# Patient Record
Sex: FEMALE | Race: WHITE
[De-identification: names, ages, dates, MRNs, and addresses within clinical notes are randomized per-mention and may not be internally consistent; named-entity substitution may affect disease eponyms.]

---

## 2020-01-17 ENCOUNTER — HOSPITAL ENCOUNTER (EMERGENCY)
Dept: HOSPITAL 8 - ED | Age: 25
Discharge: HOME | End: 2020-01-17
Payer: COMMERCIAL

## 2020-01-17 VITALS — HEIGHT: 61 IN | WEIGHT: 100.31 LBS | BODY MASS INDEX: 18.94 KG/M2

## 2020-01-17 VITALS — DIASTOLIC BLOOD PRESSURE: 67 MMHG | SYSTOLIC BLOOD PRESSURE: 109 MMHG

## 2020-01-17 DIAGNOSIS — K29.00: Primary | ICD-10-CM

## 2020-01-17 DIAGNOSIS — F17.210: ICD-10-CM

## 2020-01-17 LAB
ALBUMIN SERPL-MCNC: 4.2 G/DL (ref 3.4–5)
ALP SERPL-CCNC: 46 U/L (ref 45–117)
ALT SERPL-CCNC: 12 U/L (ref 12–78)
ANION GAP SERPL CALC-SCNC: 9 MMOL/L (ref 5–15)
BASOPHILS # BLD AUTO: 0.11 X10^3/UL (ref 0–0.1)
BASOPHILS NFR BLD AUTO: 2 % (ref 0–1)
BILIRUB SERPL-MCNC: 0.7 MG/DL (ref 0.2–1)
CALCIUM SERPL-MCNC: 8.6 MG/DL (ref 8.5–10.1)
CHLORIDE SERPL-SCNC: 108 MMOL/L (ref 98–107)
CREAT SERPL-MCNC: 0.85 MG/DL (ref 0.55–1.02)
CULTURE INDICATED?: YES
EOSINOPHIL # BLD AUTO: 0.3 X10^3/UL (ref 0–0.4)
EOSINOPHIL NFR BLD AUTO: 4 % (ref 1–7)
ERYTHROCYTE [DISTWIDTH] IN BLOOD BY AUTOMATED COUNT: 13.3 % (ref 9.6–15.2)
HCG UR SG: 1.02 (ref 1–1.03)
LYMPHOCYTES # BLD AUTO: 1.94 X10^3/UL (ref 1–3.4)
LYMPHOCYTES NFR BLD AUTO: 28 % (ref 22–44)
MCH RBC QN AUTO: 33.4 PG (ref 27–34.8)
MCHC RBC AUTO-ENTMCNC: 32.5 G/DL (ref 32.4–35.8)
MCV RBC AUTO: 102.9 FL (ref 80–100)
MD: NO
MICROSCOPIC: (no result)
MONOCYTES # BLD AUTO: 0.73 X10^3/UL (ref 0.2–0.8)
MONOCYTES NFR BLD AUTO: 11 % (ref 2–9)
NEUTROPHILS # BLD AUTO: 3.9 X10^3/UL (ref 1.8–6.8)
NEUTROPHILS NFR BLD AUTO: 56 % (ref 42–75)
PLATELET # BLD AUTO: 337 X10^3/UL (ref 130–400)
PMV BLD AUTO: 7.8 FL (ref 7.4–10.4)
PROT SERPL-MCNC: 7.6 G/DL (ref 6.4–8.2)
RBC # BLD AUTO: 4.47 X10^6/UL (ref 3.82–5.3)

## 2020-01-17 PROCEDURE — 80053 COMPREHEN METABOLIC PANEL: CPT

## 2020-01-17 PROCEDURE — 81025 URINE PREGNANCY TEST: CPT

## 2020-01-17 PROCEDURE — 87086 URINE CULTURE/COLONY COUNT: CPT

## 2020-01-17 PROCEDURE — 76700 US EXAM ABDOM COMPLETE: CPT

## 2020-01-17 PROCEDURE — 83690 ASSAY OF LIPASE: CPT

## 2020-01-17 PROCEDURE — 85025 COMPLETE CBC W/AUTO DIFF WBC: CPT

## 2020-01-17 PROCEDURE — 81001 URINALYSIS AUTO W/SCOPE: CPT

## 2020-01-17 PROCEDURE — 99284 EMERGENCY DEPT VISIT MOD MDM: CPT

## 2020-01-17 PROCEDURE — 36415 COLL VENOUS BLD VENIPUNCTURE: CPT

## 2020-01-17 NOTE — NUR
PT TO ROOM FROM LOBBY, CHANGED INTO GOWN, UPRIGHT ON GURNEY AWAKE & CALM, 
RESPONDS APPROP TO STAFF, NAD, COMFORT MEASURES PROVIDED, FRIEND AT BS, CALL 
LIGHT WITHIN REACH.

## 2020-01-17 NOTE — NUR
PT REMAINS UPRIGHT ON GURNEY AWAKE & CALM, WATCHING TV, RESPONDS APPROP TO 
STAFF, NAD, COMFORT MEASURES PROVIDED, FRIEND AT BS, CALL LIGHT WITHIN REACH.

## 2022-07-09 ENCOUNTER — OFFICE VISIT (OUTPATIENT)
Dept: URGENT CARE | Facility: PHYSICIAN GROUP | Age: 27
End: 2022-07-09
Payer: COMMERCIAL

## 2022-07-09 VITALS
TEMPERATURE: 99.2 F | RESPIRATION RATE: 20 BRPM | DIASTOLIC BLOOD PRESSURE: 68 MMHG | HEIGHT: 61 IN | HEART RATE: 86 BPM | BODY MASS INDEX: 20.77 KG/M2 | WEIGHT: 110 LBS | OXYGEN SATURATION: 93 % | SYSTOLIC BLOOD PRESSURE: 92 MMHG

## 2022-07-09 DIAGNOSIS — M79.641 BILATERAL HAND PAIN: ICD-10-CM

## 2022-07-09 DIAGNOSIS — M79.642 BILATERAL HAND PAIN: ICD-10-CM

## 2022-07-09 PROCEDURE — 99203 OFFICE O/P NEW LOW 30 MIN: CPT

## 2022-07-09 RX ORDER — KETOROLAC TROMETHAMINE 30 MG/ML
30 INJECTION, SOLUTION INTRAMUSCULAR; INTRAVENOUS ONCE
Status: COMPLETED | OUTPATIENT
Start: 2022-07-09 | End: 2022-07-09

## 2022-07-09 RX ORDER — RISPERIDONE 0.5 MG/1
0.5 TABLET ORAL 2 TIMES DAILY
COMMUNITY
Start: 2022-05-23

## 2022-07-09 RX ORDER — BUSPIRONE HYDROCHLORIDE 10 MG/1
10 TABLET ORAL 2 TIMES DAILY
COMMUNITY
Start: 2022-05-23

## 2022-07-09 RX ORDER — KETOROLAC TROMETHAMINE 30 MG/ML
30 INJECTION, SOLUTION INTRAMUSCULAR; INTRAVENOUS ONCE
Status: DISCONTINUED | OUTPATIENT
Start: 2022-07-09 | End: 2022-07-09

## 2022-07-09 RX ADMIN — KETOROLAC TROMETHAMINE 30 MG: 30 INJECTION, SOLUTION INTRAMUSCULAR; INTRAVENOUS at 15:44

## 2022-07-09 ASSESSMENT — ENCOUNTER SYMPTOMS
CHILLS: 0
FEVER: 0
TINGLING: 1
WEIGHT LOSS: 0

## 2022-07-09 NOTE — PROGRESS NOTES
"Subjective:   Anthony Kong is a 26 y.o. female who presents for Arm Pain (Bilateral- 1x wk /Tingling in finger tups, numbness )      HPI:  This is a 26-year-old female who presents today with bilateral hand pain x3 days.  She reports associated tingling to both index and middle fingers.  She reports 10 out of 10 \"sharp pain \".  She denies trauma.  Believes discomfort is associated with repeated movements.  She has tried Epson salt soaks, ice, elevation with minimal relief.  Denies back or neck pain.      Review of Systems   Constitutional: Negative for chills, fever, malaise/fatigue and weight loss.   Neurological: Positive for tingling.   All other systems reviewed and are negative.      Medications:    Current Outpatient Medications on File Prior to Visit   Medication Sig Dispense Refill   • risperiDONE (RISPERDAL) 0.5 MG Tab Take 0.5 mg by mouth 2 times a day.     • busPIRone (BUSPAR) 10 MG Tab tablet Take 10 mg by mouth 2 times a day.     • polyethylene glycol/lytes (MIRALAX) Pack Take 1 Packet by mouth every day. (Patient not taking: Reported on 7/9/2022) 10 Each 3     No current facility-administered medications on file prior to visit.        Allergies:   Patient has no known allergies.    Problem List:   There is no problem list on file for this patient.       Surgical History:  No past surgical history on file.    Past Social Hx:   Social History     Tobacco Use   • Smoking status: Never Smoker   • Smokeless tobacco: Never Used   Vaping Use   • Vaping Use: Every day   • Substances: Nicotine, Flavoring   • Devices: Pre-filled or refillable cartridge, Refillable tank   Substance Use Topics   • Alcohol use: No   • Drug use: No          Problem list, medications, and allergies reviewed by myself today in Epic.     Objective:     BP (!) 92/68 (BP Location: Right arm, Patient Position: Sitting, BP Cuff Size: Adult)   Pulse 86   Temp 37.3 °C (99.2 °F) (Temporal)   Resp 20   Ht 1.549 m (5' 1\")   Wt " 49.9 kg (110 lb)   SpO2 93%   BMI 20.78 kg/m²     Physical Exam  Vitals and nursing note reviewed.   Constitutional:       General: She is not in acute distress.     Appearance: Normal appearance. She is normal weight. She is not ill-appearing or toxic-appearing.   HENT:      Head: Normocephalic and atraumatic.      Nose: Nose normal.   Cardiovascular:      Rate and Rhythm: Normal rate and regular rhythm.      Pulses: Normal pulses.      Heart sounds: Normal heart sounds.   Pulmonary:      Effort: Pulmonary effort is normal. No respiratory distress.      Breath sounds: Normal breath sounds. No stridor. No wheezing or rales.   Musculoskeletal:      Right hand: Tenderness present. No swelling, deformity or bony tenderness. Normal range of motion. Normal capillary refill. Normal pulse.      Left hand: Tenderness present. No swelling, deformity or bony tenderness. Normal range of motion. Normal capillary refill. Normal pulse.      Comments: +TTP to palms of both right and left hands.   Skin:     General: Skin is warm and dry.      Capillary Refill: Capillary refill takes less than 2 seconds.      Findings: No erythema or rash.   Neurological:      General: No focal deficit present.      Mental Status: She is alert and oriented to person, place, and time. Mental status is at baseline.   Psychiatric:         Mood and Affect: Mood normal.         Behavior: Behavior normal.         Thought Content: Thought content normal.         Judgment: Judgment normal.         Assessment/Plan:     Diagnosis and associated orders:   1. Bilateral hand pain  ketorolac (TORADOL) injection 30 mg    DISCONTINUED: ketorolac (TORADOL) injection 30 mg      Comments/MDM:   Pt is clinically stable at today's acute urgent care visit.  No acute distress noted. Appropriate for outpatient management at this time.   Unclear etiology of patient's pain.  Unremarkable physical exam today.  Patient given dose of Toradol for pain in clinic today  Advised  patient to continue use of Tylenol and ibuprofen as needed ice, elevate, and minimize use of hands  Return for any new or worsening signs or symptoms  Patient agreeable to plan of care today, verbalizes understanding             Please note that this dictation was created using voice recognition software. I have made a reasonable attempt to correct obvious errors, but I expect that there are errors of grammar and possibly content that I did not discover before finalizing the note.    This note was electronically signed by DALLAS Mosqueda

## 2022-07-17 ENCOUNTER — HOSPITAL ENCOUNTER (EMERGENCY)
Facility: MEDICAL CENTER | Age: 27
End: 2022-07-18
Attending: EMERGENCY MEDICINE
Payer: COMMERCIAL

## 2022-07-17 DIAGNOSIS — M25.532 PAIN IN BOTH WRISTS: ICD-10-CM

## 2022-07-17 DIAGNOSIS — M25.531 PAIN IN BOTH WRISTS: ICD-10-CM

## 2022-07-17 DIAGNOSIS — M25.50 ARTHRALGIA, UNSPECIFIED JOINT: ICD-10-CM

## 2022-07-17 DIAGNOSIS — R52 BODY ACHES: ICD-10-CM

## 2022-07-17 PROCEDURE — 99284 EMERGENCY DEPT VISIT MOD MDM: CPT

## 2022-07-18 ENCOUNTER — APPOINTMENT (OUTPATIENT)
Dept: RADIOLOGY | Facility: MEDICAL CENTER | Age: 27
End: 2022-07-18
Attending: EMERGENCY MEDICINE
Payer: COMMERCIAL

## 2022-07-18 ENCOUNTER — APPOINTMENT (OUTPATIENT)
Dept: LAB | Facility: MEDICAL CENTER | Age: 27
End: 2022-07-18
Payer: COMMERCIAL

## 2022-07-18 VITALS
OXYGEN SATURATION: 97 % | WEIGHT: 103.17 LBS | HEIGHT: 64 IN | SYSTOLIC BLOOD PRESSURE: 117 MMHG | DIASTOLIC BLOOD PRESSURE: 73 MMHG | RESPIRATION RATE: 18 BRPM | BODY MASS INDEX: 17.61 KG/M2 | TEMPERATURE: 98.2 F | HEART RATE: 63 BPM

## 2022-07-18 LAB
ALBUMIN SERPL BCP-MCNC: 4.9 G/DL (ref 3.2–4.9)
ALBUMIN/GLOB SERPL: 1.9 G/DL
ALP SERPL-CCNC: 43 U/L (ref 30–99)
ALT SERPL-CCNC: 7 U/L (ref 2–50)
ANION GAP SERPL CALC-SCNC: 14 MMOL/L (ref 7–16)
APPEARANCE UR: CLEAR
AST SERPL-CCNC: 10 U/L (ref 12–45)
BACTERIA #/AREA URNS HPF: ABNORMAL /HPF
BASOPHILS # BLD AUTO: 0.2 % (ref 0–1.8)
BASOPHILS # BLD: 0.03 K/UL (ref 0–0.12)
BILIRUB SERPL-MCNC: 0.3 MG/DL (ref 0.1–1.5)
BILIRUB UR QL STRIP.AUTO: NEGATIVE
BUN SERPL-MCNC: 8 MG/DL (ref 8–22)
CALCIUM SERPL-MCNC: 9.5 MG/DL (ref 8.4–10.2)
CHLORIDE SERPL-SCNC: 104 MMOL/L (ref 96–112)
CO2 SERPL-SCNC: 20 MMOL/L (ref 20–33)
COLOR UR: YELLOW
CREAT SERPL-MCNC: 0.65 MG/DL (ref 0.5–1.4)
CRP SERPL HS-MCNC: <0.3 MG/DL (ref 0–0.75)
EOSINOPHIL # BLD AUTO: 0.01 K/UL (ref 0–0.51)
EOSINOPHIL NFR BLD: 0.1 % (ref 0–6.9)
EPI CELLS #/AREA URNS HPF: ABNORMAL /HPF
ERYTHROCYTE [DISTWIDTH] IN BLOOD BY AUTOMATED COUNT: 42.8 FL (ref 35.9–50)
GFR SERPLBLD CREATININE-BSD FMLA CKD-EPI: 125 ML/MIN/1.73 M 2
GLOBULIN SER CALC-MCNC: 2.6 G/DL (ref 1.9–3.5)
GLUCOSE SERPL-MCNC: 122 MG/DL (ref 65–99)
GLUCOSE UR STRIP.AUTO-MCNC: NEGATIVE MG/DL
HCG SERPL QL: NEGATIVE
HCT VFR BLD AUTO: 41.8 % (ref 37–47)
HGB BLD-MCNC: 14.5 G/DL (ref 12–16)
IMM GRANULOCYTES # BLD AUTO: 0.06 K/UL (ref 0–0.11)
IMM GRANULOCYTES NFR BLD AUTO: 0.5 % (ref 0–0.9)
KETONES UR STRIP.AUTO-MCNC: NEGATIVE MG/DL
LEUKOCYTE ESTERASE UR QL STRIP.AUTO: NEGATIVE
LYMPHOCYTES # BLD AUTO: 0.98 K/UL (ref 1–4.8)
LYMPHOCYTES NFR BLD: 7.9 % (ref 22–41)
MCH RBC QN AUTO: 33.4 PG (ref 27–33)
MCHC RBC AUTO-ENTMCNC: 34.7 G/DL (ref 33.6–35)
MCV RBC AUTO: 96.3 FL (ref 81.4–97.8)
MICRO URNS: ABNORMAL
MONOCYTES # BLD AUTO: 0.41 K/UL (ref 0–0.85)
MONOCYTES NFR BLD AUTO: 3.3 % (ref 0–13.4)
MUCOUS THREADS #/AREA URNS HPF: ABNORMAL /HPF
NEUTROPHILS # BLD AUTO: 10.89 K/UL (ref 2–7.15)
NEUTROPHILS NFR BLD: 88 % (ref 44–72)
NITRITE UR QL STRIP.AUTO: NEGATIVE
NRBC # BLD AUTO: 0 K/UL
NRBC BLD-RTO: 0 /100 WBC
PH UR STRIP.AUTO: 6 [PH] (ref 5–8)
PLATELET # BLD AUTO: 374 K/UL (ref 164–446)
PMV BLD AUTO: 9.6 FL (ref 9–12.9)
POTASSIUM SERPL-SCNC: 3.8 MMOL/L (ref 3.6–5.5)
PROT SERPL-MCNC: 7.5 G/DL (ref 6–8.2)
PROT UR QL STRIP: NEGATIVE MG/DL
RBC # BLD AUTO: 4.34 M/UL (ref 4.2–5.4)
RBC # URNS HPF: ABNORMAL /HPF
RBC UR QL AUTO: ABNORMAL
SODIUM SERPL-SCNC: 138 MMOL/L (ref 135–145)
SP GR UR STRIP.AUTO: 1.01
WBC # BLD AUTO: 12.4 K/UL (ref 4.8–10.8)
WBC #/AREA URNS HPF: ABNORMAL /HPF

## 2022-07-18 PROCEDURE — 80053 COMPREHEN METABOLIC PANEL: CPT

## 2022-07-18 PROCEDURE — 85025 COMPLETE CBC W/AUTO DIFF WBC: CPT

## 2022-07-18 PROCEDURE — 81001 URINALYSIS AUTO W/SCOPE: CPT

## 2022-07-18 PROCEDURE — 73110 X-RAY EXAM OF WRIST: CPT | Mod: RT

## 2022-07-18 PROCEDURE — 700102 HCHG RX REV CODE 250 W/ 637 OVERRIDE(OP): Performed by: EMERGENCY MEDICINE

## 2022-07-18 PROCEDURE — 96372 THER/PROPH/DIAG INJ SC/IM: CPT

## 2022-07-18 PROCEDURE — 36415 COLL VENOUS BLD VENIPUNCTURE: CPT

## 2022-07-18 PROCEDURE — 86140 C-REACTIVE PROTEIN: CPT

## 2022-07-18 PROCEDURE — 96374 THER/PROPH/DIAG INJ IV PUSH: CPT

## 2022-07-18 PROCEDURE — 73110 X-RAY EXAM OF WRIST: CPT | Mod: LT

## 2022-07-18 PROCEDURE — 84703 CHORIONIC GONADOTROPIN ASSAY: CPT

## 2022-07-18 PROCEDURE — A9270 NON-COVERED ITEM OR SERVICE: HCPCS | Performed by: EMERGENCY MEDICINE

## 2022-07-18 PROCEDURE — 700111 HCHG RX REV CODE 636 W/ 250 OVERRIDE (IP): Performed by: EMERGENCY MEDICINE

## 2022-07-18 RX ORDER — MORPHINE SULFATE 4 MG/ML
4 INJECTION INTRAVENOUS ONCE
Status: COMPLETED | OUTPATIENT
Start: 2022-07-18 | End: 2022-07-18

## 2022-07-18 RX ORDER — ONDANSETRON 2 MG/ML
4 INJECTION INTRAMUSCULAR; INTRAVENOUS ONCE
Status: COMPLETED | OUTPATIENT
Start: 2022-07-18 | End: 2022-07-18

## 2022-07-18 RX ORDER — GABAPENTIN 300 MG/1
300 CAPSULE ORAL 3 TIMES DAILY
Qty: 90 CAPSULE | Refills: 0 | Status: SHIPPED | OUTPATIENT
Start: 2022-07-18 | End: 2022-08-17

## 2022-07-18 RX ORDER — GABAPENTIN 300 MG/1
300 CAPSULE ORAL ONCE
Status: COMPLETED | OUTPATIENT
Start: 2022-07-18 | End: 2022-07-18

## 2022-07-18 RX ORDER — NAPROXEN 500 MG/1
500 TABLET ORAL 2 TIMES DAILY WITH MEALS
Qty: 20 TABLET | Refills: 0 | Status: SHIPPED | OUTPATIENT
Start: 2022-07-18 | End: 2022-07-28

## 2022-07-18 RX ADMIN — GABAPENTIN 300 MG: 300 CAPSULE ORAL at 00:30

## 2022-07-18 RX ADMIN — ONDANSETRON 4 MG: 2 INJECTION INTRAMUSCULAR; INTRAVENOUS at 00:35

## 2022-07-18 RX ADMIN — MORPHINE SULFATE 4 MG: 4 INJECTION INTRAVENOUS at 00:48

## 2022-07-18 NOTE — ED NOTES
DC instructions reviewed. Verbalizes understanding and denies further need. Ambulated to exit with a steady gait.

## 2022-07-18 NOTE — ED TRIAGE NOTES
"Chief Complaint   Patient presents with   • Joint Pain     Worsening over the last few weeks, PCP and primary recommend to see RA  but has not been able to get in to see one      • Nausea     BP (!) 123/90   Pulse 66   Temp 36.8 °C (98.3 °F) (Temporal)   Resp 18   Ht 1.626 m (5' 4\")   Wt 46.8 kg (103 lb 2.8 oz)   LMP 07/14/2022   SpO2 96%   BMI 17.71 kg/m²     Quit smoke marijuana about a week prior to symptom on set to try and get a better job.   "

## 2022-07-18 NOTE — ED NOTES
Pt to the ED for evaluation of joint pain for the past few weeks, primarily in her bilateral wrists. No deformity noted. Pt to see rheumatologist but is having difficulty getting an appointment. Also being seen for constant nausea.

## 2022-07-18 NOTE — ED PROVIDER NOTES
ED Provider Note    CHIEF COMPLAINT  Chief Complaint   Patient presents with   • Joint Pain     Worsening over the last few weeks, PCP and primary recommend to see JENNIFER COREA but has not been able to get in to see one      • Nausea     HPI  Patient is a 26-year-old patient with a past medical history of chronic joint discomfort who presents to the emergency room for assessment of worsening joint pain.  This has been present for some time and has been seen in the outpatient setting for bilateral wrist pain, which is now expanding to nonspecific muscle aches, shoulder and back discomfort, joint pains at the knees and ankles have been intermittent infection.  She denies any acute traumatic injury, does not believe she had any recent sick exposures, has not had any associated cough, shortness of breath or chest pains or any abdominal discomfort or nausea, vomiting or diarrheal illness.      She got outpatient labs done several days ago and took a COVID test because of the duration of her symptoms and this was negative.  She has not had a recent fevers or chills.  She is somewhat unsure about her last menstrual period and possible urinary frequency.    PPE Note: I personally donned full PPE for all patient encounters during this visit, includin extremity pains who presents emergency room forg being clean-shaven with an N95 respirator mask, gloves, and goggles.     Review of the chart shows that the patient has been seen in the clinic on 7/9 for hand pain and paresthesias.  She is currently on Risperdal, BuSpar. Referred to outpt physician for further immunology workup    REVIEW OF SYSTEMS  See HPI for further details. All other systems are negative.     PAST MEDICAL HISTORY   has a past medical history of ASTHMA.    SOCIAL HISTORY  Social History     Tobacco Use   • Smoking status: Never Smoker   • Smokeless tobacco: Never Used   Vaping Use   • Vaping Use: Every day   • Substances: Nicotine, Flavoring   • Devices: Pre-filled  "or refillable cartridge, Refillable tank   Substance and Sexual Activity   • Alcohol use: No   • Drug use: Yes     Comment: marijuana   • Sexual activity: Not on file       SURGICAL HISTORY  patient denies any surgical history    CURRENT MEDICATIONS  Home Medications     Reviewed by Louise Thakkar R.N. (Registered Nurse) on 07/17/22 at 2317  Med List Status: <None>   Medication Last Dose Status   busPIRone (BUSPAR) 10 MG Tab tablet  Active   polyethylene glycol/lytes (MIRALAX) Pack  Active   risperiDONE (RISPERDAL) 0.5 MG Tab  Active                ALLERGIES  No Known Allergies    PHYSICAL EXAM  VITAL SIGNS: BP (!) 123/90   Pulse 66   Temp 36.8 °C (98.3 °F) (Temporal)   Resp 18   Ht 1.626 m (5' 4\")   Wt 46.8 kg (103 lb 2.8 oz)   LMP 07/14/2022   SpO2 96%   BMI 17.71 kg/m²   Pulse ox interpretation: I interpret this pulse ox as normal.  Genl: F sitting in gurney comfortably, speaking clearly, appears in no acute distress   Head: NC/AT   ENT: Mucous membranes moist, posterior pharynx clear, uvula midline, nares patent bilaterally   Eyes: Normal sclera, pupils equal round reactive to light  Neck: Supple, FROM, no LAD appreciated  Pulmonary: Lungs are clear to auscultation bilaterally  Chest: No TTP  CV:  RRR, no murmur appreciated, pulses 2+ in both upper and lower extremities,  Abdomen: soft, NT/ND; no rebound/guarding, no masses palpated, no HSM  : no CVA or suprapubic tenderness  Musculoskeletal: Pain free ROM of the neck. Moving upper and lower extremities and spontaneous in coordinated fashion.  Bilateral wrists are tender along the ulnar prominence with no gross angulation or skin changes.  She has a negative Phalen and no distal neuropraxia's.  There is no atrophy in all other upper and lower extremities and joints are without true pinpoint tenderness.  There is no muscle bellies that are asymmetric or showing signs of atrophy.  Neuro: A&Ox4 (person, place, time, situation), speech fluent, gait " steady, no focal deficits appreciated, No cerebellar signs. Sensation is grossly intact in the distal upper and lower extremities.  5/5 strength in  and dorsiflexion/plantar flexion of the ankles  Psych: Patient has an appropriate affect and behavior  Skin: No rash or lesions.  No pallor or jaundice.  No cyanosis.  Warm and dry.     DIAGNOSTIC STUDIES / PROCEDURES    LABS  Labs Reviewed   CBC WITH DIFFERENTIAL - Abnormal; Notable for the following components:       Result Value    WBC 12.4 (*)     MCH 33.4 (*)     Neutrophils-Polys 88.00 (*)     Lymphocytes 7.90 (*)     Neutrophils (Absolute) 10.89 (*)     Lymphs (Absolute) 0.98 (*)     All other components within normal limits   COMP METABOLIC PANEL - Abnormal; Notable for the following components:    Glucose 122 (*)     AST(SGOT) 10 (*)     All other components within normal limits   URINALYSIS - Abnormal; Notable for the following components:    Occult Blood Small (*)     All other components within normal limits   URINE MICROSCOPIC (W/UA) - Abnormal; Notable for the following components:    Bacteria Rare (*)     All other components within normal limits   HCG QUAL SERUM   CRP QUANTITIVE (NON-CARDIAC)   ESTIMATED GFR     RADIOLOGY  DX-WRIST-COMPLETE 3+ LEFT    (Results Pending)   DX-WRIST-COMPLETE 3+ RIGHT    (Results Pending)     COURSE & MEDICAL DECISION MAKING  Pertinent Labs & Imaging studies reviewed. (See chart for details)    DDX: Medication side effect, dehydration, electrolyte derangement, hypokalemia, hypercalcemia, pregnancy, inflammatory/autoimmune process, UTI, wrist injuries    MDM    Initial evaluation at 2350:  Seen and evaluated for symptoms as described above.  On initial assessment she is afebrile, no tachycardia or hemodynamic instability.  She has had some element of nonspecific joint pains and presents with what is described as severe joint discomfort.  It is reassuring with her vital signs and minimal recreation of this joint  tenderness she is on multiple psychiatric medications.  She is denying any recent changes to these dosings and does not come off of these medications and is currently undergoing an outpatient work-up for possible autoimmune or inflammatory process.  Her main joint discomfort is treated with subcutaneous morphine [place] x-rays.    Lab work for any gross metabolic derangements, electrolyte shifts is also obtained.    Patient was not interested in having a repeat viral test that she had outpt COVID test that was normal.  We discussed getting a flu test but she has not had any measured fevers and was fine holding off on this at this time.    X-rays show no evidence of acute fracture, bony breakdown or other acute findings.    Patient does not have an acute metabolic abnormality, she has a nonspecific leukocytosis elevation leftward shift with no bandemia.  She has not had tachycardia or developing febrile illness and with no pinpoint infectious etiology I doubt that this is anything beyond reactive changes.  No findings that suggest an acute emergent process and currently undergoing outpatient work-up for inflammatory markers and possible RA diagnosis.  She does describe having some worsening pain that did not respond to Toradol/steroids or other regularly scheduled anti-inflammatories and lidocaine patches have not been helpful.  We had a long discussion about not being started on narcotic medications and I feel comfortable starting her on a small amount of gabapentin that she plans on following up with her outpatient provider about its effectiveness.    Return precautions discussed, discharged home in stable condition.    FINAL IMPRESSION  Visit Diagnoses     ICD-10-CM   1. Arthralgia, unspecified joint  M25.50   2. Body aches  R52   3. Pain in both wrists  M25.531    M25.532     Electronically signed by: Siddharth aMldonado M.D., 7/17/2022 11:42 PM